# Patient Record
Sex: FEMALE | Race: WHITE | NOT HISPANIC OR LATINO | URBAN - METROPOLITAN AREA
[De-identification: names, ages, dates, MRNs, and addresses within clinical notes are randomized per-mention and may not be internally consistent; named-entity substitution may affect disease eponyms.]

---

## 2023-03-25 ENCOUNTER — EMERGENCY (EMERGENCY)
Facility: HOSPITAL | Age: 18
LOS: 1 days | Discharge: HOME | End: 2023-03-25
Attending: EMERGENCY MEDICINE | Admitting: EMERGENCY MEDICINE
Payer: COMMERCIAL

## 2023-03-25 VITALS
RESPIRATION RATE: 17 BRPM | OXYGEN SATURATION: 97 % | TEMPERATURE: 98 F | SYSTOLIC BLOOD PRESSURE: 123 MMHG | DIASTOLIC BLOOD PRESSURE: 76 MMHG | WEIGHT: 130.07 LBS | HEART RATE: 96 BPM

## 2023-03-25 LAB
APPEARANCE UR: CLEAR — SIGNIFICANT CHANGE UP
BACTERIA # UR AUTO: PRESENT /HPF
BILIRUB UR-MCNC: NEGATIVE — SIGNIFICANT CHANGE UP
COLOR SPEC: YELLOW — SIGNIFICANT CHANGE UP
DIFF PNL FLD: ABNORMAL
EPI CELLS # UR: ABNORMAL /HPF (ref 0–5)
GLUCOSE UR QL: NEGATIVE — SIGNIFICANT CHANGE UP
KETONES UR-MCNC: NEGATIVE — SIGNIFICANT CHANGE UP
LEUKOCYTE ESTERASE UR-ACNC: ABNORMAL
NITRITE UR-MCNC: NEGATIVE — SIGNIFICANT CHANGE UP
PH UR: 7 — SIGNIFICANT CHANGE UP (ref 5–8)
PROT UR-MCNC: NEGATIVE MG/DL — SIGNIFICANT CHANGE UP
RBC CASTS # UR COMP ASSIST: ABNORMAL /HPF
SP GR SPEC: 1.01 — SIGNIFICANT CHANGE UP (ref 1–1.03)
UROBILINOGEN FLD QL: 1 E.U./DL — SIGNIFICANT CHANGE UP
WBC UR QL: ABNORMAL /HPF

## 2023-03-25 PROCEDURE — 99284 EMERGENCY DEPT VISIT MOD MDM: CPT

## 2023-03-25 RX ORDER — AZELASTINE HCL 0.05 %
1 DROPS OPHTHALMIC (EYE)
Qty: 5 | Refills: 0
Start: 2023-03-25 | End: 2023-03-29

## 2023-03-25 RX ORDER — DIPHENHYDRAMINE HCL 50 MG
25 CAPSULE ORAL ONCE
Refills: 0 | Status: COMPLETED | OUTPATIENT
Start: 2023-03-25 | End: 2023-03-25

## 2023-03-25 RX ORDER — PREDNISOLONE SODIUM PHOSPHATE 1 %
2 DROPS OPHTHALMIC (EYE) ONCE
Refills: 0 | Status: COMPLETED | OUTPATIENT
Start: 2023-03-25 | End: 2023-03-25

## 2023-03-25 RX ORDER — FAMOTIDINE 10 MG/ML
20 INJECTION INTRAVENOUS DAILY
Refills: 0 | Status: DISCONTINUED | OUTPATIENT
Start: 2023-03-25 | End: 2023-03-29

## 2023-03-25 RX ADMIN — Medication 2 DROP(S): at 14:57

## 2023-03-25 RX ADMIN — Medication 40 MILLIGRAM(S): at 14:58

## 2023-03-25 RX ADMIN — FAMOTIDINE 20 MILLIGRAM(S): 10 INJECTION INTRAVENOUS at 14:57

## 2023-03-25 RX ADMIN — Medication 25 MILLIGRAM(S): at 14:57

## 2023-03-25 NOTE — ED ADULT NURSE REASSESSMENT NOTE - NS ED NURSE REASSESS COMMENT FT1
Break coverage for rn latoya, introdcued self to patient, gcs 15 speaking in full sentences, nil airway compromise, has swelling to left eye noted but nil visual disturbance, meds given, downtime on sunrise so not scanning , checked correct meds, patient, time, route and strength prior to giving
Pt asking for urine test as urine cloudy with offensive smell, dr candelario
Pt walked out of ed after being reassessed by Dr. Subramanian. Pt reports she did not want to wait for dc papers and left.

## 2023-03-25 NOTE — ED ADULT NURSE NOTE - CAS EDP DISCH TYPE
Chart reviewed for refill request atorvastatin    LOV:  11/23/21- Sherry    NOV:  5/25/22- Sherry    Filled per protocol. Has taken and tolerated since taking       Home

## 2023-03-25 NOTE — ED PROVIDER NOTE - CLINICAL SUMMARY MEDICAL DECISION MAKING FREE TEXT BOX
19 y/o F presents to the ED s/p allergic reaction. Plan for Tx with Benadryl, Pepcid, and Deltasone. Will reassess afterwards.

## 2023-03-25 NOTE — ED ADULT NURSE NOTE - OBJECTIVE STATEMENT
Pt came in c/o left eye lid swelling/redness/itching x today 20 minutes pta after touching peanut butter and touching her face. Pt denies throat itching, tightness, or swelling. Denies airway involvement. A&Ox3 speaking in full clear sentences

## 2023-03-25 NOTE — ED PROVIDER NOTE - OBJECTIVE STATEMENT
19 y/o F with PMH of peanut allergies presents to the ED for evaluation s/p allergic reaction. Pt reports there was some peanut butter on the table which got onto her phone case. Pt touched her phone and scratched her L eye afterwards. Pt developed redness and L eyelid swelling which prompted ED evaluation. Pt did not take any medications for symptoms prior to arrival. Pt denies wheezing, CP, SOB, rash, or N/V.

## 2023-03-25 NOTE — ED PROVIDER NOTE - PATIENT PORTAL LINK FT
You can access the FollowMyHealth Patient Portal offered by [facility] by registering at the following website: [email or web address]. By joining [employer]’s FollowMyHealth portal, you will also be able to view your health information using other applications (apps) compatible with our system.

## 2023-03-25 NOTE — ED PROVIDER NOTE - ENMT, MLM
Airway patent. Nasal congestion on L side. Mouth with normal mucosa. Throat has no vesicles, no oropharyngeal exudates and uvula is midline.

## 2023-03-27 DIAGNOSIS — Z91.010 ALLERGY TO PEANUTS: ICD-10-CM

## 2023-03-27 DIAGNOSIS — T78.3XXA ANGIONEUROTIC EDEMA, INITIAL ENCOUNTER: ICD-10-CM
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
## 2023-03-27 DIAGNOSIS — H02.846 EDEMA OF LEFT EYE, UNSPECIFIED EYELID: ICD-10-CM

## 2023-03-27 DIAGNOSIS — H10.12 ACUTE ATOPIC CONJUNCTIVITIS, LEFT EYE: ICD-10-CM

## 2023-03-31 NOTE — ED POST DISCHARGE NOTE - RESULT SUMMARY
ua with low colony count, lm to call back for clinical follow up, voice mail full was sent message via text.

## 2024-09-17 NOTE — ED ADULT TRIAGE NOTE - CCCP TRG CHIEF CMPLNT
(J03.90) Exudative tonsillitis  (primary encounter diagnosis)  Comment:   Plan: cefdinir (OMNICEF) 300 MG capsule          The rapid strep test is negative today, culture is pending.  You may call for the results if you are curious, at this point I am treating you based on your symptoms and physical exam today.    Complete the antibiotic as prescribed.    Salt water gargles as needed.    Tylenol or ibuprofen as needed for pain.    Replace toothbrush in 48 hours.    If the strep culture is positive, it is considered contagious for the first 24 hours of the antibiotic.    (R07.0) Throat pain  Comment:   Plan: Streptococcus A Rapid Screen w/Reflex to PCR -         Clinic Collect, Group A Streptococcus PCR         Throat Swab            (H92.01) Ear pain, referred, right  Comment: Secondary to throat infection  Plan: Take antibiotic as prescribed above.  Ibuprofen as needed for pain.          
allergic reaction